# Patient Record
(demographics unavailable — no encounter records)

---

## 2025-02-04 NOTE — ADDENDUM
[FreeTextEntry1] : Updated on 1/31/2024 Pleas note - patients orders for EKG on 01/22/24  were updated later due to computer error on the day of the performing the test.

## 2025-02-04 NOTE — HISTORY OF PRESENT ILLNESS
[FreeTextEntry1] : cc: physical ,seasonal allergies, uterine fibroids ,feet pain  [de-identified] : Patient presented  for  her physical. She denies CP,SOB,Abd pain, no N,V,C,D . Pt c/o chronic postnasal drip/seasonal allergies, seen by ENT not much better, She suffers from uterine fibroids , needs f/u US  ,HX of bleeding c/o feet pain chronic, OTC meds, not better

## 2025-02-04 NOTE — HEALTH RISK ASSESSMENT
[Very Good] : ~his/her~  mood as very good [Yes] : Yes [Monthly or less (1 pt)] : Monthly or less (1 point) [1 or 2 (0 pts)] : 1 or 2 (0 points) [Never (0 pts)] : Never (0 points) [No] : In the past 12 months have you used drugs other than those required for medical reasons? No [No falls in past year] : Patient reported no falls in the past year [0] : 2) Feeling down, depressed, or hopeless: Not at all (0) [PHQ-2 Negative - No further assessment needed] : PHQ-2 Negative - No further assessment needed [Never] : Never [Patient reported mammogram was normal] : Patient reported mammogram was normal [Patient reported PAP Smear was abnormal] : Patient reported PAP Smear was abnormal [Patient reported bone density results were abnormal] : Patient reported bone density results were abnormal [Patient reported colonoscopy was normal] : Patient reported colonoscopy was normal [None] : None [With Family] : lives with family [Employed] : employed [# Of Children ___] : has [unfilled] children [Sexually Active] : sexually active [Feels Safe at Home] : Feels safe at home [Fully functional (bathing, dressing, toileting, transferring, walking, feeding)] : Fully functional (bathing, dressing, toileting, transferring, walking, feeding) [Fully functional (using the telephone, shopping, preparing meals, housekeeping, doing laundry, using] : Fully functional and needs no help or supervision to perform IADLs (using the telephone, shopping, preparing meals, housekeeping, doing laundry, using transportation, managing medications and managing finances) [Smoke Detector] : smoke detector [Carbon Monoxide Detector] : carbon monoxide detector [Seat Belt] :  uses seat belt [Sunscreen] : uses sunscreen [With Patient/Caregiver] : , with patient/caregiver [Aggressive treatment] : aggressive treatment [NO] : No [Single] : single [FreeTextEntry1] : none [de-identified] : ENT, Ortho  [Audit-CScore] : 1 [de-identified] : walking,  ,yoga  [de-identified] : healthy  [MBD3Cqfti] : 0 [Change in mental status noted] : No change in mental status noted [Language] : denies difficulty with language [MammogramDate] : 03/24 [PapSmearDate] : 04/24 [BoneDensityDate] : 04/22 [BoneDensityComments] : osteopenia  [ColonoscopyDate] : 06/24 [HIVDate] : 03/21 [HepatitisCDate] : 03/21 [AdvancecareDate] : 02/25

## 2025-02-04 NOTE — DATA REVIEWED
[FreeTextEntry1] : last blood work d/w the pt at length   EKG - Sinus nam at 57 bpm, bpm, similar to last one

## 2025-02-04 NOTE — PHYSICAL EXAM
[No Acute Distress] : no acute distress [Well Nourished] : well nourished [Well Developed] : well developed [Well-Appearing] : well-appearing [Normal Sclera/Conjunctiva] : normal sclera/conjunctiva [PERRL] : pupils equal round and reactive to light [EOMI] : extraocular movements intact [Normal Outer Ear/Nose] : the outer ears and nose were normal in appearance [Normal Oropharynx] : the oropharynx was normal [No JVD] : no jugular venous distention [No Lymphadenopathy] : no lymphadenopathy [Supple] : supple [Thyroid Normal, No Nodules] : the thyroid was normal and there were no nodules present [No Respiratory Distress] : no respiratory distress  [No Accessory Muscle Use] : no accessory muscle use [Clear to Auscultation] : lungs were clear to auscultation bilaterally [Normal Rate] : normal rate  [Regular Rhythm] : with a regular rhythm [Normal S1, S2] : normal S1 and S2 [No Murmur] : no murmur heard [No Varicosities] : no varicosities [Pedal Pulses Present] : the pedal pulses are present [No Edema] : there was no peripheral edema [No Palpable Aorta] : no palpable aorta [Normal Appearance] : normal in appearance [No Nipple Discharge] : no nipple discharge [No Axillary Lymphadenopathy] : no axillary lymphadenopathy [Soft] : abdomen soft [Non Tender] : non-tender [Non-distended] : non-distended [No Masses] : no abdominal mass palpated [No HSM] : no HSM [Normal Bowel Sounds] : normal bowel sounds [No CVA Tenderness] : no CVA  tenderness [No Spinal Tenderness] : no spinal tenderness [No Joint Swelling] : no joint swelling [Grossly Normal Strength/Tone] : grossly normal strength/tone [No Rash] : no rash [Coordination Grossly Intact] : coordination grossly intact [No Focal Deficits] : no focal deficits [Normal Gait] : normal gait [Normal Affect] : the affect was normal [Normal Insight/Judgement] : insight and judgment were intact [de-identified] : + nasaturbine edema

## 2025-02-04 NOTE — REVIEW OF SYSTEMS
[Negative] : Heme/Lymph [Hearing Loss] : hearing loss [Nasal Discharge] : nasal discharge [Joint Pain] : joint pain [Earache] : no earache [Nosebleed] : no nosebleeds [Hoarseness] : no hoarseness [Sore Throat] : no sore throat [FreeTextEntry8] : HX of vaginal bleeding  [FreeTextEntry9] : + foot pain

## 2025-03-10 NOTE — HISTORY OF PRESENT ILLNESS
[de-identified] : This patient presents today complaining of weakness and inability to adduct the ring and little fingers.  This happened after she was weightbearing and leading down onto the right hand for a long period of time.  Pain level 2 out of 10.  She notes pain that radiates down the right arm towards the hand.  She has a history of open reduction internal fixation of distal radius fracture in 2016.  Takes Advil intermittently.

## 2025-03-10 NOTE — PHYSICAL EXAM
[de-identified] : The patient appears well nourished  and in no apparent distress.  The patient is alert and oriented to person, place, and time.   Affect and mood appear normal.    The head is normocephalic and atraumatic.  The eyes reveal normal sclera and extra ocular muscles are intact.   The neck appears normal with no jugular venous distention or masses noted.   Skin shows normal turgor with no evidence of eczema or psoriasis.  No respiratory distress noted.  The patient ambulates with a normal gait.  The right hand has full range of motion of the digits.  She has weakness of abduction of the ring and little fingers.  Sensation to the digits is intact.  There is no triggering or locking of the fingers.  There is no tenderness about the hand.  There is no soft tissue swelling.  There is no warmth or erythema.  Flexor and extensor tendons are intact.   Pulses are intact.  Capillary refill is normal.   There is no clubbing, cyanosis, or edema noted.    There is no lympadenopathy of the extremity.  [de-identified] : AP, lateral, and oblique views of the right hand were obtained.  Status post open reduction internal fixation of distal radius fracture with a well-healed fracture and excellent placement of the plate.  The x-rays show that the joint spaces are well maintained without arthritic changes.  There is no evidence of fracture or dislocation.

## 2025-03-10 NOTE — DISCUSSION/SUMMARY
[de-identified] : This patient presents today complaining of weakness and inability to adduct the ring and little fingers.  Her physical exam reveals evidence of a likely ulnar neuropathy at the wrist secondary to weightbearing with a completely extended wrist posture.  I recommend a course of meloxicam 15 mg each day for 10 to 14 days.  Do think the symptoms will resolve.  If symptoms do not resolve in the next 2 weeks I would see her back in the office at that time for reevaluation.  I recommended a course of Mobic, 15mg per day for the next 2 weeks.  The patient was given a prescription for the Mobic with directions to take it once daily with the first meal of the day.  They were instructed to stop the medicine and call the office if there are any adverse reactions to the medicine.  At least 30 minutes was spent performing the evaluation and management on today's office visit.  This includes but is not limited to preparing to see patient including review of any test results or outside medical records, obtaining and/or reviewing separately obtained history, performing examination and evaluation, counseling and educating the patient on their diagnosis and treatment recommendations, ordering medications, tests, or procedures, documenting clinical information in the electronic health record, independently interpreting results (not separately reported) and communicating results to the patient, and coordination of care.

## 2025-03-10 NOTE — REASON FOR VISIT
[Follow-Up Visit] : a follow-up visit for [FreeTextEntry2] : Patient presents for evaluation of right ring and right pinky pain.